# Patient Record
Sex: MALE | Race: WHITE | NOT HISPANIC OR LATINO | URBAN - METROPOLITAN AREA
[De-identification: names, ages, dates, MRNs, and addresses within clinical notes are randomized per-mention and may not be internally consistent; named-entity substitution may affect disease eponyms.]

---

## 2024-07-31 ENCOUNTER — TRANSFERRED RECORDS (OUTPATIENT)
Dept: HEALTH INFORMATION MANAGEMENT | Facility: CLINIC | Age: 54
End: 2024-07-31

## 2024-08-23 ENCOUNTER — PRE VISIT (OUTPATIENT)
Dept: ONCOLOGY | Facility: CLINIC | Age: 54
End: 2024-08-23
Payer: COMMERCIAL

## 2024-08-23 ENCOUNTER — TRANSCRIBE ORDERS (OUTPATIENT)
Dept: OTHER | Age: 54
End: 2024-08-23

## 2024-08-23 DIAGNOSIS — C20 RECTAL CANCER (H): Primary | ICD-10-CM

## 2024-08-23 NOTE — TELEPHONE ENCOUNTER
Action August 23, 2024 1:41 PM ABT   Action Taken Warm Transfer from Karine - GRACIE    Spoke to very briefly nurse from [facility] - Miguel, she had to leave but was given [facility] team phone number to call back if needed. Miguel asked for the mailing address, as they will have radiology send image discs. Miguel roberts will try to have their radiology team ship overnight/ or have disc arrive by Monday/tuesday    [address]  [id number]  [place] [address]    Update September 3, 2024 2:43 PM -  Imaging received from  and brought to [facility] for uploading.      RECORDS STATUS - ALL OTHER DIAGNOSIS      RECORDS RECEIVED FROM: [facility]   NOTES STATUS DETAILS   OFFICE NOTE from referring provider Count includes the [facility] 07/31/24: Dr. Clive Meyer   OFFICE NOTE from medical oncologist Count includes the [facility] 07/31/24: Dr. Clive Meyer   OFFICE NOTE from other specialist Count includes the [facility] Rad Onc:  07/26/24: Dr. Azeem Serrano    Gastro:  01/13/20: Dr. Vianca Bauer Sayed   DISCHARGE SUMMARY from hospital Count includes the [facility] 07/11/24, 02/07/20: [facility]   OPERATIVE REPORT Count includes the [facility] 02/07/20: laparoscopic extended right hemicolectomy    MEDICATION LIST Count includes the [facility]    LABS     PATHOLOGY REPORTS Reports in Count includes the [facility] 02/07/20: [id number]  01/13/20: [id number]   ANYTHING RELATED TO DIAGNOSIS Count includes the [facility] Most recent 08/23/24   PATHOLOGY FEDEX TRACKING   Tracking #:   GENONOMIC TESTING     TYPE:     IMAGING (NEED IMAGES & REPORT)     CT SCANS Req 08/23[facility] UI:  07/12/24-01/13/20: CT CAP  07/11/24, 01/10/24: CT Brain  01/11/24: CT Pelvis   MRI Req 08/23[facility] UI:  07/11/24: MR Brain  01/28/20: MR Abd   PET Req 08/23[facility] UI:  12/27/23-07/08/22: PET CT Tumor   IMAGE DISC FEDEX [facility] Tracking #: [id number]

## 2024-08-26 ENCOUNTER — MEDICAL CORRESPONDENCE (OUTPATIENT)
Dept: HEALTH INFORMATION MANAGEMENT | Facility: CLINIC | Age: 54
End: 2024-08-26
Payer: COMMERCIAL

## 2024-08-28 ENCOUNTER — PATIENT OUTREACH (OUTPATIENT)
Dept: ONCOLOGY | Facility: CLINIC | Age: 54
End: 2024-08-28
Payer: COMMERCIAL

## 2024-08-28 NOTE — PROGRESS NOTES
Oncology referral received from Dr Meyer, Oncology [facility], for patient with metastatic rectal cancer. Seeking clinical trials.     (See my bookmarks for pertinent records in Epic)    Hx colon cancer 1/2020 via colonoscopy, Descending colon-at the splenic flexure nonobstructing 3 to 4 cm fungating mass with an ulcerated center concerning for malignancy. Pathology from this mass-adenocarcinoma-MMR intact.     Pt is s/p treatment by Dr Meyer at [facility]. Recently disease progression with brain mets 7/2024, s/p radiation to intracranial mets. Pt is seeking clinical trials at [facility] (among other health systems) for treatment options.     Pt is most interested in the specific trial below, but per PI (Dr Garcia), there are no current enrollment openings. Case discussed w/ Jonnathan Borrero & Brielle, they recommend pt see our [facility] ([facility]) for formal clinical trial consult & screening. I spoke to pt today to convey this plan; pt declines [facility] appt at this time, and he is aware that the specific trial below has no current openings for enrollment. Pt plans to pursue other trials at [facility], etc. Pt has our # if he wishes to proceed w/ [facility] consult at [facility] in the future. I have notified referring office as well.       ClinicalTrials.gov ID# [id number]     A Study of [id number] in Patients With KRAS, BRAF and Select TOSHIA/MAPK Mutation-Positive Cancers       Addendum 8/28: I spoke to pt's wife and relayed the discussion above. She agrees & verbalizes understanding of this plan.        Samson Jernigan RN  Oncology Nurse Navigator  [facility]  [phone]